# Patient Record
(demographics unavailable — no encounter records)

---

## 2025-06-06 NOTE — FAMILY HISTORY
[___ inches] : [unfilled] inches [FreeTextEntry5] : 12 [FreeTextEntry2] : 13y sister overweight healthy, no menarche

## 2025-06-06 NOTE — HISTORY OF PRESENT ILLNESS
[FreeTextEntry2] : Tony is a 9y1m M referred for evaluation of obesity.  Has a big appetite since 4/4yo.  Maternal grandmother has him during the week, with mom on weekends.  3 meals per day, snacks 3-4/d (chips 2/d, fruit, popcorn).  Juice 1/d, coke zero, lowfat milk 1-2cups/d.  Developmentally normal, generally healthy, no history of steroids.  [TWNoteComboBox1] : obesity

## 2025-06-06 NOTE — PAST MEDICAL HISTORY
[At Term] : at term [ Section] : by  section [None] : there were no delivery complications [Age Appropriate] : age appropriate developmental milestones met [de-identified] : elective [FreeTextEntry1] : 7lb7oz [FreeTextEntry5] : IEP for behavior but feels no longer needs

## 2025-06-06 NOTE — PAST MEDICAL HISTORY
[At Term] : at term [ Section] : by  section [None] : there were no delivery complications [Age Appropriate] : age appropriate developmental milestones met [de-identified] : elective [FreeTextEntry1] : 7lb7oz [FreeTextEntry5] : IEP for behavior but feels no longer needs

## 2025-06-06 NOTE — CONSULT LETTER
[Dear  ___] : Dear  [unfilled], [Consult Letter:] : I had the pleasure of evaluating your patient, [unfilled]. [( Thank you for referring [unfilled] for consultation for _____ )] : Thank you for referring [unfilled] for consultation for [unfilled] [Please see my note below.] : Please see my note below. [Consult Closing:] : Thank you very much for allowing me to participate in the care of this patient.  If you have any questions, please do not hesitate to contact me. [Sincerely,] : Sincerely, [FreeTextEntry3] : Leela Chao MD Director, Pediatric Endocrinology [facility], [facility]  of Pediatrics  [facility] [employer] at [facility]

## 2025-06-06 NOTE — ASSESSMENT
[FreeTextEntry1] : labs nonfasting if continues weight gain despite modifications consider gene panel

## 2025-06-06 NOTE — CONSULT LETTER
[Dear  ___] : Dear  [unfilled], [Consult Letter:] : I had the pleasure of evaluating your patient, [unfilled]. [( Thank you for referring [unfilled] for consultation for _____ )] : Thank you for referring [unfilled] for consultation for [unfilled] [Please see my note below.] : Please see my note below. [Consult Closing:] : Thank you very much for allowing me to participate in the care of this patient.  If you have any questions, please do not hesitate to contact me. [Sincerely,] : Sincerely, [FreeTextEntry3] : Leela Chao MD Director, Pediatric Endocrinology [facility],   of Pediatrics  [facility] [employer] [facility] at [facility]

## 2025-06-06 NOTE — DATA REVIEWED
[FreeTextEntry1] : Growth records: Weight ~90-95th until 4y, increase to >97th at 5y, exponential increase since Height steady 95-97th  Labs 7/16/24 CB nl CMP ok Ca 10.8 Alb 5

## 2025-06-06 NOTE — DISCUSSION/SUMMARY
[FreeTextEntry1] : REINIER is obese. He has no features on history and physical exam to suggest an endocrinopathy as the primary reason for his obesity.  Linear growth has been excellent. It is therefore it is unlikely that there is underlying endocrine pathology. We have recommended baseline bloodwork evaluation.  He is referred for nutrition consult.  Hopefully, with appropriate diet and exercise counseling, we can stop his weight gain.

## 2025-06-06 NOTE — REVIEW OF SYSTEMS
[Nl] : Neurological [Abdominal Pain] : abdominal pain [Change in Activity] : no change in activity [Change in Vision] : no change in vision  [Diarrhea] : no diarrhea [Constipation] : no constipation [Sleep Disturbances] : ~T no sleep disturbances [Headache] : no headache

## 2025-06-06 NOTE — PHYSICAL EXAM
[Healthy Appearing] : healthy appearing [Well Nourished] : well nourished [Interactive] : interactive [Normal Appearance] : normal appearance [Well formed] : well formed [Normally Set] : normally set [Normal S1 and S2] : normal S1 and S2 [Clear to Ausculation Bilaterally] : clear to auscultation bilaterally [Abdomen Soft] : soft [Abdomen Tenderness] : non-tender [] : no hepatosplenomegaly [Normal] : normal  [Obese] : obese [Dysmorphic] : non-dysmorphic [Acanthosis Nigricans___] : no acanthosis nigricans [Pale Striae on Flanks] : no pale striae on flanks [Goiter] : no goiter [Murmur] : no murmurs [1] : was Speedy stage 1 [Testes] : normal [___] : [unfilled] [de-identified] : age appropriate [de-identified] : nl oropharynx [FreeTextEntry2] : retractile testes, sunken penis [de-identified] : nonfocal

## 2025-06-06 NOTE — HISTORY OF PRESENT ILLNESS
[FreeTextEntry2] : Tony is a 9y1m M referred for evaluation of obesity.  Has a big appetite since 4/6yo.  Maternal grandmother has him during the week, with mom on weekends.  3 meals per day, snacks 3-4/d (chips 2/d, fruit, popcorn).  Juice 1/d, coke zero, lowfat milk 1-2cups/d.  Developmentally normal, generally healthy, no history of steroids.  [TWNoteComboBox1] : obesity